# Patient Record
Sex: FEMALE | Race: WHITE | NOT HISPANIC OR LATINO | URBAN - METROPOLITAN AREA
[De-identification: names, ages, dates, MRNs, and addresses within clinical notes are randomized per-mention and may not be internally consistent; named-entity substitution may affect disease eponyms.]

---

## 2017-12-13 ENCOUNTER — APPOINTMENT (OUTPATIENT)
Dept: WOMENS IMAGING | Facility: HOSPITAL | Age: 57
End: 2017-12-13

## 2017-12-13 PROCEDURE — 77067 SCR MAMMO BI INCL CAD: CPT | Performed by: RADIOLOGY

## 2018-12-19 ENCOUNTER — APPOINTMENT (OUTPATIENT)
Dept: WOMENS IMAGING | Facility: HOSPITAL | Age: 58
End: 2018-12-19

## 2018-12-19 PROCEDURE — 77067 SCR MAMMO BI INCL CAD: CPT | Performed by: RADIOLOGY

## 2020-01-08 ENCOUNTER — APPOINTMENT (OUTPATIENT)
Dept: WOMENS IMAGING | Facility: HOSPITAL | Age: 60
End: 2020-01-08

## 2020-01-08 PROCEDURE — 77067 SCR MAMMO BI INCL CAD: CPT | Performed by: RADIOLOGY

## 2020-01-08 PROCEDURE — 77063 BREAST TOMOSYNTHESIS BI: CPT | Performed by: RADIOLOGY

## 2021-06-16 ENCOUNTER — APPOINTMENT (OUTPATIENT)
Dept: WOMENS IMAGING | Facility: HOSPITAL | Age: 61
End: 2021-06-16

## 2021-06-16 PROCEDURE — 77067 SCR MAMMO BI INCL CAD: CPT | Performed by: RADIOLOGY

## 2021-06-16 PROCEDURE — 77063 BREAST TOMOSYNTHESIS BI: CPT | Performed by: RADIOLOGY

## 2022-03-16 ENCOUNTER — OFFICE VISIT (OUTPATIENT)
Dept: INTERNAL MEDICINE | Facility: CLINIC | Age: 62
End: 2022-03-16

## 2022-03-16 VITALS
HEART RATE: 87 BPM | HEIGHT: 66 IN | TEMPERATURE: 97.8 F | WEIGHT: 177 LBS | DIASTOLIC BLOOD PRESSURE: 80 MMHG | BODY MASS INDEX: 28.45 KG/M2 | OXYGEN SATURATION: 99 % | RESPIRATION RATE: 16 BRPM | SYSTOLIC BLOOD PRESSURE: 148 MMHG

## 2022-03-16 DIAGNOSIS — Z11.59 NEED FOR HEPATITIS C SCREENING TEST: ICD-10-CM

## 2022-03-16 DIAGNOSIS — Z00.00 ROUTINE HEALTH MAINTENANCE: ICD-10-CM

## 2022-03-16 DIAGNOSIS — Z00.00 ANNUAL PHYSICAL EXAM: Primary | ICD-10-CM

## 2022-03-16 DIAGNOSIS — R03.0 ELEVATED BLOOD PRESSURE READING: ICD-10-CM

## 2022-03-16 PROCEDURE — 93000 ELECTROCARDIOGRAM COMPLETE: CPT | Performed by: NURSE PRACTITIONER

## 2022-03-16 PROCEDURE — 99386 PREV VISIT NEW AGE 40-64: CPT | Performed by: NURSE PRACTITIONER

## 2022-03-16 NOTE — PROGRESS NOTES
Subjective   Trang Resendez is a 61 y.o. female. Patient is here today for   Chief Complaint   Patient presents with   • Annual Exam          Vitals:    03/16/22 0956   BP: 148/80   Pulse: 87   Resp: 16   Temp: 97.8 °F (36.6 °C)   SpO2: 99%     Body mass index is 28.57 kg/m².    The following portions of the patient's history were reviewed and updated as appropriate: allergies, current medications, past family history, past medical history, past social history, past surgical history and problem list.    Past Medical History:   Diagnosis Date   • Allergic     penicillin, sulfa drugs, cyclines, amoxicillin, clindamycin, erythromycin, bee venom      Allergies   Allergen Reactions   • Bee Venom Swelling   • Clindamycin Rash   • Erythromycin Rash   • Penicillins Rash   • Sulfa Antibiotics Rash   • Tetracycline Rash      Social History     Socioeconomic History   • Marital status: Unknown   Tobacco Use   • Smoking status: Never Smoker   • Smokeless tobacco: Never Used   Substance and Sexual Activity   • Alcohol use: Never   • Drug use: Never   • Sexual activity: Yes     Partners: Male     Birth control/protection: Post-menopausal, None        Current Outpatient Medications:   •  COLLAGEN PO, , Disp: , Rfl:      EKG  ECG Report   ECG 12 Lead    Date/Time: 3/16/2022 10:44 AM  Performed by: Mili Freitas APRN  Authorized by: Mili Freitas APRN   Comparison: not compared with previous ECG   Previous ECG: no previous ECG available  Rhythm: sinus rhythm  Rate: normal  QRS axis: normal  Other findings comments: pwave v1/v2     Clinical impression: non-specific ECG              Objective   History of Present Illness   Trang Resendez 61 y.o. female  New patient who presents for an Annual Wellness Visit.  she has a history of There is no problem list on file for this patient.    She fell down stairs in January and has a hematoma to the left upper leg. She has been seeing an ortho doctor and has a follow up soon. At her  appts her blood pressure was elevated. She attributes this to the fall and stress. She went to the urgent care for BP check and has been checking her BP at home twice daily. Initially her BP was elevated and the last 2 weeks it has been in the normal range. BP log reviewed with patient.   Health Habits:  Dental Exam. up to date  Eye Exam. up to date  Exercise: 6 times/week.  Current exercise activities include: bicycling outdoors, cardiovascular workout on exercise equipment, light weights/kettlebells, running/ jogging, swimming and weightlifting      Lab Results (most recent)     None        PHQ-9 Depression Screening  Little interest or pleasure in doing things? 0-->not at all   Feeling down, depressed, or hopeless? 0-->not at all   Trouble falling or staying asleep, or sleeping too much?     Feeling tired or having little energy?     Poor appetite or overeating?     Feeling bad about yourself - or that you are a failure or have let yourself or your family down?     Trouble concentrating on things, such as reading the newspaper or watching television?     Moving or speaking so slowly that other people could have noticed? Or the opposite - being so fidgety or restless that you have been moving around a lot more than usual?     Thoughts that you would be better off dead, or of hurting yourself in some way?     PHQ-9 Total Score 0   If you checked off any problems, how difficult have these problems made it for you to do your work, take care of things at home, or get along with other people?               Review of Systems   HENT: Negative.    Respiratory: Negative.    Cardiovascular: Negative.    Gastrointestinal: Negative.    Genitourinary: Negative.    Musculoskeletal: Negative.         Hematoma of left upper leg after fall    Skin:        Hematoma on left leg from fall    Neurological: Negative.    Psychiatric/Behavioral: The patient is nervous/anxious (increased stress ).        Physical Exam  Vitals and nursing  note reviewed.   Constitutional:       General: She is not in acute distress.     Appearance: Normal appearance. She is well-developed and well-groomed.   HENT:      Head: Normocephalic.      Right Ear: Tympanic membrane and ear canal normal.      Left Ear: Tympanic membrane and ear canal normal.      Mouth/Throat:      Pharynx: Oropharynx is clear.   Eyes:      General: Lids are normal.      Conjunctiva/sclera: Conjunctivae normal.   Neck:      Thyroid: No thyromegaly.   Cardiovascular:      Rate and Rhythm: Normal rate and regular rhythm.   Pulmonary:      Effort: Pulmonary effort is normal.      Breath sounds: Normal breath sounds.   Abdominal:      General: Bowel sounds are normal.      Palpations: Abdomen is soft.   Musculoskeletal:      Cervical back: Neck supple.        Legs:    Skin:     General: Skin is warm and dry.   Neurological:      Mental Status: She is alert and oriented to person, place, and time.   Psychiatric:         Mood and Affect: Mood normal.         ASSESSMENT       Problems Addressed this Visit    None     Visit Diagnoses     Annual physical exam    -  Primary    Relevant Orders    CBC & Differential    Comprehensive Metabolic Panel    HCV Antibody Reflex To KATHERINE    Lipid Panel With LDL / HDL Ratio    TSH Rfx On Abnormal To Free T4    Urinalysis With Microscopic - Urine, Clean Catch    ECG 12 Lead    Routine health maintenance        Need for hepatitis C screening test        Relevant Orders    HCV Antibody Reflex To KATHERINE    Elevated blood pressure reading          Diagnoses       Codes Comments    Annual physical exam    -  Primary ICD-10-CM: Z00.00  ICD-9-CM: V70.0     Routine health maintenance     ICD-10-CM: Z00.00  ICD-9-CM: V70.0     Need for hepatitis C screening test     ICD-10-CM: Z11.59  ICD-9-CM: V73.89     Elevated blood pressure reading     ICD-10-CM: R03.0  ICD-9-CM: 796.2           PLAN  Bp is slightly elevated but has been normal at home , continue to monitor and call with  readings in a few weeks  Continue with exercise  [facility]  Recommend shingrix and tdap which are currently unavailable in office  Age and sex appropriate physical exam performed and documented. Updated past medical, family, social and surgical histories as well as allergies and care team list. Addressed care gaps listed in the medical record.   -Encouraged seat belt use for every car ride for patient and all occupants. Discussed securing of all guns in the home for patient and family protection. Encouraged sunscreen use to reduce risk of skin cancer for any days with sun exposure over 20 minutes. Recommended helmet if biking or riding motorcycle to prevent head trauma. Discussed the importance of smoke and carbon monoxide detectors in the home.   -Encouraged annual dental and vision exams as part of their overall health.   -Encouraged minimum of 30 minutes or more of exercise at a brisk walk or higher 5 days per week combined with a well-balanced diet.   -Immunizations reviewed and updated in EMR.     Return in about 1 year (around 3/16/2023) for Annual physical, with labs.

## 2022-03-17 LAB
ALBUMIN SERPL-MCNC: 4.7 G/DL (ref 3.8–4.8)
ALBUMIN/GLOB SERPL: 2 {RATIO} (ref 1.2–2.2)
ALP SERPL-CCNC: 93 IU/L (ref 44–121)
ALT SERPL-CCNC: 11 IU/L (ref 0–32)
APPEARANCE UR: CLEAR
AST SERPL-CCNC: 28 IU/L (ref 0–40)
BACTERIA #/AREA URNS HPF: ABNORMAL /[HPF]
BASOPHILS # BLD AUTO: 0.1 X10E3/UL (ref 0–0.2)
BASOPHILS NFR BLD AUTO: 1 %
BILIRUB SERPL-MCNC: 0.3 MG/DL (ref 0–1.2)
BILIRUB UR QL STRIP: NEGATIVE
BUN SERPL-MCNC: 15 MG/DL (ref 8–27)
BUN/CREAT SERPL: 17 (ref 12–28)
CALCIUM SERPL-MCNC: 9.4 MG/DL (ref 8.7–10.3)
CASTS URNS QL MICRO: ABNORMAL /LPF
CHLORIDE SERPL-SCNC: 103 MMOL/L (ref 96–106)
CHOLEST SERPL-MCNC: 146 MG/DL (ref 100–199)
CO2 SERPL-SCNC: 21 MMOL/L (ref 20–29)
COLOR UR: YELLOW
CREAT SERPL-MCNC: 0.88 MG/DL (ref 0.57–1)
EGFRCR SERPLBLD CKD-EPI 2021: 74 ML/MIN/1.73
EOSINOPHIL # BLD AUTO: 0.2 X10E3/UL (ref 0–0.4)
EOSINOPHIL NFR BLD AUTO: 3 %
EPI CELLS #/AREA URNS HPF: >10 /HPF (ref 0–10)
ERYTHROCYTE [DISTWIDTH] IN BLOOD BY AUTOMATED COUNT: 12.6 % (ref 11.7–15.4)
GLOBULIN SER CALC-MCNC: 2.4 G/DL (ref 1.5–4.5)
GLUCOSE SERPL-MCNC: 85 MG/DL (ref 65–99)
GLUCOSE UR QL STRIP: NEGATIVE
HCT VFR BLD AUTO: 43.2 % (ref 34–46.6)
HCV AB S/CO SERPL IA: <0.1 S/CO RATIO (ref 0–0.9)
HCV AB SERPL QL IA: NORMAL
HDLC SERPL-MCNC: 68 MG/DL
HGB BLD-MCNC: 14.6 G/DL (ref 11.1–15.9)
HGB UR QL STRIP: NEGATIVE
IMM GRANULOCYTES # BLD AUTO: 0 X10E3/UL (ref 0–0.1)
IMM GRANULOCYTES NFR BLD AUTO: 0 %
KETONES UR QL STRIP: NEGATIVE
LDLC SERPL CALC-MCNC: 62 MG/DL (ref 0–99)
LDLC/HDLC SERPL: 0.9 RATIO (ref 0–3.2)
LEUKOCYTE ESTERASE UR QL STRIP: ABNORMAL
LYMPHOCYTES # BLD AUTO: 2 X10E3/UL (ref 0.7–3.1)
LYMPHOCYTES NFR BLD AUTO: 35 %
MCH RBC QN AUTO: 30 PG (ref 26.6–33)
MCHC RBC AUTO-ENTMCNC: 33.8 G/DL (ref 31.5–35.7)
MCV RBC AUTO: 89 FL (ref 79–97)
MICRO URNS: ABNORMAL
MONOCYTES # BLD AUTO: 0.5 X10E3/UL (ref 0.1–0.9)
MONOCYTES NFR BLD AUTO: 9 %
NEUTROPHILS # BLD AUTO: 2.9 X10E3/UL (ref 1.4–7)
NEUTROPHILS NFR BLD AUTO: 52 %
NITRITE UR QL STRIP: NEGATIVE
PH UR STRIP: 6.5 [PH] (ref 5–7.5)
PLATELET # BLD AUTO: 232 X10E3/UL (ref 150–450)
POTASSIUM SERPL-SCNC: 4.4 MMOL/L (ref 3.5–5.2)
PROT SERPL-MCNC: 7.1 G/DL (ref 6–8.5)
PROT UR QL STRIP: NEGATIVE
RBC # BLD AUTO: 4.87 X10E6/UL (ref 3.77–5.28)
RBC #/AREA URNS HPF: ABNORMAL /HPF (ref 0–2)
SODIUM SERPL-SCNC: 142 MMOL/L (ref 134–144)
SP GR UR STRIP: 1.02 (ref 1–1.03)
TRIGL SERPL-MCNC: 83 MG/DL (ref 0–149)
TSH SERPL DL<=0.005 MIU/L-ACNC: 4.3 UIU/ML (ref 0.45–4.5)
UROBILINOGEN UR STRIP-MCNC: 0.2 MG/DL (ref 0.2–1)
VLDLC SERPL CALC-MCNC: 16 MG/DL (ref 5–40)
WBC # BLD AUTO: 5.6 X10E3/UL (ref 3.4–10.8)
WBC #/AREA URNS HPF: ABNORMAL /HPF (ref 0–5)

## 2022-06-22 ENCOUNTER — APPOINTMENT (OUTPATIENT)
Dept: WOMENS IMAGING | Facility: HOSPITAL | Age: 62
End: 2022-06-22

## 2022-06-22 PROCEDURE — 77067 SCR MAMMO BI INCL CAD: CPT | Performed by: RADIOLOGY

## 2022-06-22 PROCEDURE — 77063 BREAST TOMOSYNTHESIS BI: CPT | Performed by: RADIOLOGY

## 2023-03-08 DIAGNOSIS — Z00.00 ROUTINE HEALTH MAINTENANCE: Primary | ICD-10-CM

## 2023-03-15 LAB
ALBUMIN SERPL-MCNC: 4.3 G/DL (ref 3.5–5.2)
ALBUMIN/GLOB SERPL: 1.7 G/DL
ALP SERPL-CCNC: 95 U/L (ref 39–117)
ALT SERPL-CCNC: 9 U/L (ref 1–33)
APPEARANCE UR: CLEAR
AST SERPL-CCNC: 20 U/L (ref 1–32)
BACTERIA #/AREA URNS HPF: ABNORMAL /HPF
BASOPHILS # BLD AUTO: 0.04 10*3/MM3 (ref 0–0.2)
BASOPHILS NFR BLD AUTO: 0.6 % (ref 0–1.5)
BILIRUB SERPL-MCNC: 0.4 MG/DL (ref 0–1.2)
BILIRUB UR QL STRIP: NEGATIVE
BUN SERPL-MCNC: 14 MG/DL (ref 8–23)
BUN/CREAT SERPL: 13.9 (ref 7–25)
CALCIUM SERPL-MCNC: 10 MG/DL (ref 8.6–10.5)
CASTS URNS MICRO: ABNORMAL
CHLORIDE SERPL-SCNC: 105 MMOL/L (ref 98–107)
CHOLEST SERPL-MCNC: 154 MG/DL (ref 0–200)
CO2 SERPL-SCNC: 27.3 MMOL/L (ref 22–29)
COLOR UR: YELLOW
CREAT SERPL-MCNC: 1.01 MG/DL (ref 0.57–1)
EGFRCR SERPLBLD CKD-EPI 2021: 62.7 ML/MIN/1.73
EOSINOPHIL # BLD AUTO: 0.13 10*3/MM3 (ref 0–0.4)
EOSINOPHIL NFR BLD AUTO: 2.1 % (ref 0.3–6.2)
EPI CELLS #/AREA URNS HPF: ABNORMAL /HPF
ERYTHROCYTE [DISTWIDTH] IN BLOOD BY AUTOMATED COUNT: 13 % (ref 12.3–15.4)
GLOBULIN SER CALC-MCNC: 2.5 GM/DL
GLUCOSE SERPL-MCNC: 92 MG/DL (ref 65–99)
GLUCOSE UR QL STRIP: NEGATIVE
HCT VFR BLD AUTO: 42.5 % (ref 34–46.6)
HDLC SERPL-MCNC: 62 MG/DL (ref 40–60)
HGB BLD-MCNC: 14.8 G/DL (ref 12–15.9)
HGB UR QL STRIP: NEGATIVE
IMM GRANULOCYTES # BLD AUTO: 0.01 10*3/MM3 (ref 0–0.05)
IMM GRANULOCYTES NFR BLD AUTO: 0.2 % (ref 0–0.5)
KETONES UR QL STRIP: NEGATIVE
LDLC SERPL CALC-MCNC: 74 MG/DL (ref 0–100)
LDLC/HDLC SERPL: 1.16 {RATIO}
LEUKOCYTE ESTERASE UR QL STRIP: ABNORMAL
LYMPHOCYTES # BLD AUTO: 2.29 10*3/MM3 (ref 0.7–3.1)
LYMPHOCYTES NFR BLD AUTO: 36.5 % (ref 19.6–45.3)
MCH RBC QN AUTO: 30.1 PG (ref 26.6–33)
MCHC RBC AUTO-ENTMCNC: 34.8 G/DL (ref 31.5–35.7)
MCV RBC AUTO: 86.4 FL (ref 79–97)
MONOCYTES # BLD AUTO: 0.58 10*3/MM3 (ref 0.1–0.9)
MONOCYTES NFR BLD AUTO: 9.3 % (ref 5–12)
NEUTROPHILS # BLD AUTO: 3.22 10*3/MM3 (ref 1.7–7)
NEUTROPHILS NFR BLD AUTO: 51.3 % (ref 42.7–76)
NITRITE UR QL STRIP: NEGATIVE
NRBC BLD AUTO-RTO: 0 /100 WBC (ref 0–0.2)
PH UR STRIP: 6.5 [PH] (ref 5–8)
PLATELET # BLD AUTO: 238 10*3/MM3 (ref 140–450)
POTASSIUM SERPL-SCNC: 4.6 MMOL/L (ref 3.5–5.2)
PROT SERPL-MCNC: 6.8 G/DL (ref 6–8.5)
PROT UR QL STRIP: NEGATIVE
RBC # BLD AUTO: 4.92 10*6/MM3 (ref 3.77–5.28)
RBC #/AREA URNS HPF: ABNORMAL /HPF
SODIUM SERPL-SCNC: 142 MMOL/L (ref 136–145)
SP GR UR STRIP: 1.02 (ref 1–1.03)
TRIGL SERPL-MCNC: 101 MG/DL (ref 0–150)
TSH SERPL DL<=0.005 MIU/L-ACNC: 3.95 UIU/ML (ref 0.27–4.2)
UROBILINOGEN UR STRIP-MCNC: ABNORMAL MG/DL
VLDLC SERPL CALC-MCNC: 18 MG/DL (ref 5–40)
WBC # BLD AUTO: 6.27 10*3/MM3 (ref 3.4–10.8)
WBC #/AREA URNS HPF: ABNORMAL /HPF

## 2023-03-22 ENCOUNTER — OFFICE VISIT (OUTPATIENT)
Dept: INTERNAL MEDICINE | Facility: CLINIC | Age: 63
End: 2023-03-22
Payer: COMMERCIAL

## 2023-03-22 VITALS
DIASTOLIC BLOOD PRESSURE: 80 MMHG | TEMPERATURE: 97.8 F | SYSTOLIC BLOOD PRESSURE: 138 MMHG | RESPIRATION RATE: 16 BRPM | BODY MASS INDEX: 27.48 KG/M2 | OXYGEN SATURATION: 98 % | WEIGHT: 171 LBS | HEART RATE: 88 BPM | HEIGHT: 66 IN

## 2023-03-22 DIAGNOSIS — Z00.00 ANNUAL PHYSICAL EXAM: Primary | ICD-10-CM

## 2023-03-22 DIAGNOSIS — Z23 NEED FOR TETANUS, DIPHTHERIA, AND ACELLULAR PERTUSSIS (TDAP) VACCINE IN PATIENT OF ADOLESCENT AGE OR OLDER: ICD-10-CM

## 2023-03-22 DIAGNOSIS — Z12.31 SCREENING MAMMOGRAM FOR BREAST CANCER: ICD-10-CM

## 2024-01-24 ENCOUNTER — TELEPHONE (OUTPATIENT)
Dept: INTERNAL MEDICINE | Facility: CLINIC | Age: 64
End: 2024-01-24
Payer: COMMERCIAL

## 2024-01-24 NOTE — TELEPHONE ENCOUNTER
Spoke to patient and informed her that Mili Freitas doesn't do gynecologic exams or pap smears. Mili can refer you to OBGYN. Patient states she must have misunderstood her because her last visit she thought Veronica told her she could get all that done in our office. Patient states she had [facility] do this for her previously. She states its been over a year and will call and handle this herself. I informed patient if she needed anything to call our office back.    Thanks,    Alejandrina       ----- Message from Trang Resendez sent at 1/24/2024 12:52 PM EST -----  Regarding: Appointment Request ()  Contact: [phone]  Appointment Request From: Trang Resendez    With Provider: Mili Freitas [-Primary Care Physician-]    Preferred Date Range: From 3/27/2024 To 3/27/2024    Preferred Times: Any    Reason: To address the following health maintenance concerns.  Annual Gynecologic Pelvic And Breast Exam  Pap Smear    Comments:  I have an appointment with PCP Zena on 3/27 at 3:15 pm.  This is just a reminder that she was going to perform a gynecologic exam and pap smear during the appointment.  Thank you.

## 2024-01-24 NOTE — TELEPHONE ENCOUNTER
Spoke to patient and informed her that Mili Freitas doesn't do mammograms in our office. We can refer her to [facility] or [facility]. Patient states she must have misunderstood her because her last visit she thought Veronica told her she could get all that done in our office. Patient states she had [facility] do this for her previously. She states its been over a year and will call and handle this herself. I informed patient if she needed anything to call our office back.    Alejandrina Caban     ----- Message from Trang Resendez sent at 1/24/2024 12:50 PM EST -----  Regarding: Appointment Request ()  Contact: [phone]  Appointment Request From: Trang Resendez    With Provider: Mili Freitas [-Primary Care Physician-]    Preferred Date Range: From 3/20/2024 To 3/20/2024    Preferred Times: Any    Reason: To address the following health maintenance concerns.  Mammogram    Comments:  I have an appointment for blood work at 8:10am and would like to get the mammogram either before or after.

## 2024-04-10 DIAGNOSIS — Z00.00 ROUTINE HEALTH MAINTENANCE: Primary | ICD-10-CM

## 2024-04-18 LAB
ALBUMIN SERPL-MCNC: 4.3 G/DL (ref 3.5–5.2)
ALBUMIN/GLOB SERPL: 2 G/DL
ALP SERPL-CCNC: 99 U/L (ref 39–117)
ALT SERPL-CCNC: 12 U/L (ref 1–33)
APPEARANCE UR: CLEAR
AST SERPL-CCNC: 20 U/L (ref 1–32)
BACTERIA #/AREA URNS HPF: NORMAL /HPF
BASOPHILS # BLD AUTO: 0.04 10*3/MM3 (ref 0–0.2)
BASOPHILS NFR BLD AUTO: 0.7 % (ref 0–1.5)
BILIRUB SERPL-MCNC: 0.4 MG/DL (ref 0–1.2)
BILIRUB UR QL STRIP: NEGATIVE
BUN SERPL-MCNC: 16 MG/DL (ref 8–23)
BUN/CREAT SERPL: 15.8 (ref 7–25)
CALCIUM SERPL-MCNC: 9.4 MG/DL (ref 8.6–10.5)
CASTS URNS MICRO: NORMAL
CHLORIDE SERPL-SCNC: 108 MMOL/L (ref 98–107)
CHOLEST SERPL-MCNC: 154 MG/DL (ref 0–200)
CO2 SERPL-SCNC: 25.3 MMOL/L (ref 22–29)
COLOR UR: YELLOW
CREAT SERPL-MCNC: 1.01 MG/DL (ref 0.57–1)
EGFRCR SERPLBLD CKD-EPI 2021: 62.3 ML/MIN/1.73
EOSINOPHIL # BLD AUTO: 0.1 10*3/MM3 (ref 0–0.4)
EOSINOPHIL NFR BLD AUTO: 1.7 % (ref 0.3–6.2)
EPI CELLS #/AREA URNS HPF: NORMAL /HPF
ERYTHROCYTE [DISTWIDTH] IN BLOOD BY AUTOMATED COUNT: 13.1 % (ref 12.3–15.4)
GLOBULIN SER CALC-MCNC: 2.2 GM/DL
GLUCOSE SERPL-MCNC: 80 MG/DL (ref 65–99)
GLUCOSE UR QL STRIP: NEGATIVE
HCT VFR BLD AUTO: 42.7 % (ref 34–46.6)
HDLC SERPL-MCNC: 63 MG/DL (ref 40–60)
HGB BLD-MCNC: 14.5 G/DL (ref 12–15.9)
HGB UR QL STRIP: NEGATIVE
IMM GRANULOCYTES # BLD AUTO: 0.02 10*3/MM3 (ref 0–0.05)
IMM GRANULOCYTES NFR BLD AUTO: 0.3 % (ref 0–0.5)
KETONES UR QL STRIP: ABNORMAL
LDLC SERPL CALC-MCNC: 76 MG/DL (ref 0–100)
LDLC/HDLC SERPL: 1.19 {RATIO}
LEUKOCYTE ESTERASE UR QL STRIP: NEGATIVE
LYMPHOCYTES # BLD AUTO: 1.76 10*3/MM3 (ref 0.7–3.1)
LYMPHOCYTES NFR BLD AUTO: 29.2 % (ref 19.6–45.3)
MCH RBC QN AUTO: 29.9 PG (ref 26.6–33)
MCHC RBC AUTO-ENTMCNC: 34 G/DL (ref 31.5–35.7)
MCV RBC AUTO: 88 FL (ref 79–97)
MONOCYTES # BLD AUTO: 0.42 10*3/MM3 (ref 0.1–0.9)
MONOCYTES NFR BLD AUTO: 7 % (ref 5–12)
NEUTROPHILS # BLD AUTO: 3.68 10*3/MM3 (ref 1.7–7)
NEUTROPHILS NFR BLD AUTO: 61.1 % (ref 42.7–76)
NITRITE UR QL STRIP: NEGATIVE
NRBC BLD AUTO-RTO: 0 /100 WBC (ref 0–0.2)
PH UR STRIP: 5.5 [PH] (ref 5–8)
PLATELET # BLD AUTO: 240 10*3/MM3 (ref 140–450)
POTASSIUM SERPL-SCNC: 4.5 MMOL/L (ref 3.5–5.2)
PROT SERPL-MCNC: 6.5 G/DL (ref 6–8.5)
PROT UR QL STRIP: NEGATIVE
RBC # BLD AUTO: 4.85 10*6/MM3 (ref 3.77–5.28)
RBC #/AREA URNS HPF: NORMAL /HPF
SODIUM SERPL-SCNC: 146 MMOL/L (ref 136–145)
SP GR UR STRIP: 1.02 (ref 1–1.03)
TRIGL SERPL-MCNC: 79 MG/DL (ref 0–150)
TSH SERPL DL<=0.005 MIU/L-ACNC: 2.84 UIU/ML (ref 0.27–4.2)
UROBILINOGEN UR STRIP-MCNC: ABNORMAL MG/DL
VLDLC SERPL CALC-MCNC: 15 MG/DL (ref 5–40)
WBC # BLD AUTO: 6.02 10*3/MM3 (ref 3.4–10.8)
WBC #/AREA URNS HPF: NORMAL /HPF

## 2024-04-22 ENCOUNTER — OFFICE VISIT (OUTPATIENT)
Dept: INTERNAL MEDICINE | Facility: CLINIC | Age: 64
End: 2024-04-22
Payer: COMMERCIAL

## 2024-04-22 VITALS
RESPIRATION RATE: 16 BRPM | HEART RATE: 78 BPM | HEIGHT: 66 IN | WEIGHT: 185 LBS | BODY MASS INDEX: 29.73 KG/M2 | DIASTOLIC BLOOD PRESSURE: 92 MMHG | SYSTOLIC BLOOD PRESSURE: 146 MMHG

## 2024-04-22 DIAGNOSIS — R03.0 ELEVATED BLOOD PRESSURE READING: ICD-10-CM

## 2024-04-22 DIAGNOSIS — Z00.00 ANNUAL PHYSICAL EXAM: Primary | ICD-10-CM

## 2024-04-22 PROCEDURE — 99396 PREV VISIT EST AGE 40-64: CPT | Performed by: NURSE PRACTITIONER

## 2024-04-22 NOTE — PROGRESS NOTES
Subjective   Trang Rseendez is a 63 y.o. female. Patient is here today for   Chief Complaint   Patient presents with    Annual Exam          Vitals:    04/22/24 1441   BP: 146/92   Pulse: 78   Resp: 16     Body mass index is 30.09 kg/m².    The following portions of the patient's history were reviewed and updated as appropriate: allergies, current medications, past family history, past medical history, past social history, past surgical history and problem list.    Past Medical History:   Diagnosis Date    Allergic     penicillin, sulfa drugs, cyclines, amoxicillin, clindamycin, erythromycin, bee venom    History of medical problems February 2022    Ringing in ears      Allergies   Allergen Reactions    Bee Venom Swelling    Clindamycin Rash    Erythromycin Rash    Penicillins Rash    Sulfa Antibiotics Rash    Tetracycline Rash      Social History     Socioeconomic History    Marital status:    Tobacco Use    Smoking status: Never    Smokeless tobacco: Never   Vaping Use    Vaping status: Never Used   Substance and Sexual Activity    Alcohol use: Never    Drug use: Never    Sexual activity: Yes     Partners: Male     Birth control/protection: Post-menopausal, None        Current Outpatient Medications:     COLLAGEN PO, Take  by mouth., Disp: , Rfl:        Objective   History of Present Illness   Trang Resendez 63 y.o. female who presents for an Annual physical exam.    Plan to update vaccines if needed today. She has had high blood pressure readings in the office in the past. She has been monitoring her BP at home and it is mostly in the 130/80's range but she has had some readings in the 140's  She has a gyn     Health Habits:  Dental Exam. up to date  Eye Exam. up to date  Exercise: 4 times/week.  Current exercise activities include: cardiovascular workout on exercise equipment      Preventative counseling  Discussed face to face the importance of healthy diet and exercise.     Lab Results (most recent)        Orders Only on 04/10/2024   Component Date Value Ref Range Status    Glucose 04/17/2024 80  65 - 99 mg/dL Final    BUN 04/17/2024 16  8 - 23 mg/dL Final    Creatinine 04/17/2024 1.01 (H)  0.57 - 1.00 mg/dL Final    EGFR Result 04/17/2024 62.3  >60.0 mL/min/1.73 Final    Comment: GFR Normal >60  Chronic Kidney Disease <60  Kidney Failure <15      BUN/Creatinine Ratio 04/17/2024 15.8  7.0 - 25.0 Final    Sodium 04/17/2024 146 (H)  136 - 145 mmol/L Final    Potassium 04/17/2024 4.5  3.5 - 5.2 mmol/L Final    Chloride 04/17/2024 108 (H)  98 - 107 mmol/L Final    Total CO2 04/17/2024 25.3  22.0 - 29.0 mmol/L Final    Calcium 04/17/2024 9.4  8.6 - 10.5 mg/dL Final    Total Protein 04/17/2024 6.5  6.0 - 8.5 g/dL Final    Albumin 04/17/2024 4.3  3.5 - 5.2 g/dL Final    Globulin 04/17/2024 2.2  gm/dL Final    A/G Ratio 04/17/2024 2.0  g/dL Final    Total Bilirubin 04/17/2024 0.4  0.0 - 1.2 mg/dL Final    Alkaline Phosphatase 04/17/2024 99  39 - 117 U/L Final    AST (SGOT) 04/17/2024 20  1 - 32 U/L Final    ALT (SGPT) 04/17/2024 12  1 - 33 U/L Final    Total Cholesterol 04/17/2024 154  0 - 200 mg/dL Final    Comment: Cholesterol Reference Ranges  (U.S. Department of Health and Human Services ATP III  Classifications)  Desirable          <200 mg/dL  Borderline High    200-239 mg/dL  High Risk          >240 mg/dL  Triglyceride Reference Ranges  (U.S. Department of Health and Human Services ATP III  Classifications)  Normal           <150 mg/dL  Borderline High  150-199 mg/dL  High             200-499 mg/dL  Very High        >500 mg/dL  HDL Reference Ranges  (U.S. Department of Health and Human Services ATP III  Classifications)  Low     <40 mg/dl (major risk factor for CHD)  High    >60 mg/dl ('negative' risk factor for CHD)  LDL Reference Ranges  (U.S. Department of Health and Human Services ATP III  Classifications)  Optimal          <100 mg/dL  Near Optimal     100-129 mg/dL  Borderline High  130-159 mg/dL  High              160-189 mg/dL  Very High        >189 mg/dL      Triglycerides 04/17/2024 79  0 - 150 mg/dL Final    HDL Cholesterol 04/17/2024 63 (H)  40 - 60 mg/dL Final    VLDL Cholesterol Damion 04/17/2024 15  5 - 40 mg/dL Final    LDL Chol Calc (NIH) 04/17/2024 76  0 - 100 mg/dL Final    LDL/HDL RATIO 04/17/2024 1.19   Final    TSH 04/17/2024 2.840  0.270 - 4.200 uIU/mL Final    WBC 04/17/2024 6.02  3.40 - 10.80 10*3/mm3 Final    RBC 04/17/2024 4.85  3.77 - 5.28 10*6/mm3 Final    Hemoglobin 04/17/2024 14.5  12.0 - 15.9 g/dL Final    Hematocrit 04/17/2024 42.7  34.0 - 46.6 % Final    MCV 04/17/2024 88.0  79.0 - 97.0 fL Final    MCH 04/17/2024 29.9  26.6 - 33.0 pg Final    MCHC 04/17/2024 34.0  31.5 - 35.7 g/dL Final    RDW 04/17/2024 13.1  12.3 - 15.4 % Final    Platelets 04/17/2024 240  140 - 450 10*3/mm3 Final    Neutrophil Rel % 04/17/2024 61.1  42.7 - 76.0 % Final    Lymphocyte Rel % 04/17/2024 29.2  19.6 - 45.3 % Final    Monocyte Rel % 04/17/2024 7.0  5.0 - 12.0 % Final    Eosinophil Rel % 04/17/2024 1.7  0.3 - 6.2 % Final    Basophil Rel % 04/17/2024 0.7  0.0 - 1.5 % Final    Neutrophils Absolute 04/17/2024 3.68  1.70 - 7.00 10*3/mm3 Final    Lymphocytes Absolute 04/17/2024 1.76  0.70 - 3.10 10*3/mm3 Final    Monocytes Absolute 04/17/2024 0.42  0.10 - 0.90 10*3/mm3 Final    Eosinophils Absolute 04/17/2024 0.10  0.00 - 0.40 10*3/mm3 Final    Basophils Absolute 04/17/2024 0.04  0.00 - 0.20 10*3/mm3 Final    Immature Granulocyte Rel % 04/17/2024 0.3  0.0 - 0.5 % Final    Immature Grans Absolute 04/17/2024 0.02  0.00 - 0.05 10*3/mm3 Final    nRBC 04/17/2024 0.0  0.0 - 0.2 /100 WBC Final    Specific Gravity, UA 04/17/2024 1.016  1.005 - 1.030 Final    pH, UA 04/17/2024 5.5  5.0 - 8.0 Final    Color, UA 04/17/2024 Yellow   Final    REFERENCE RANGE: Yellow, Straw    Appearance, UA 04/17/2024 Clear  Clear Final    Leukocytes, UA 04/17/2024 Negative  Negative Final    Protein 04/17/2024 Negative  Negative Final    Glucose, UA  04/17/2024 Negative  Negative Final    Ketones 04/17/2024 Trace (A)  Negative Final    Blood, UA 04/17/2024 Negative  Negative Final    Bilirubin, UA 04/17/2024 Negative  Negative Final    Urobilinogen, UA 04/17/2024 Comment   Final    Comment: 0.2 E.U./dL  REFERENCE RANGE: 0.2 - 1.0 E.U./dL      Nitrite, UA 04/17/2024 Negative  Negative Final    WBC, UA 04/17/2024 0-2  /HPF Final    REFERENCE RANGE: None Seen, 0-2    RBC, UA 04/17/2024 0-2  /HPF Final    REFERENCE RANGE: None Seen, 0-2    Epithelial Cells (non renal) 04/17/2024 0-2  /HPF Final    REFERENCE RANGE: None Seen, 0-2    Cast Type 04/17/2024 Comment   Final    HYALINE CASTS, UA            0-2              /LPF       None Seen  N    Bacteria, UA 04/17/2024 Comment  None Seen /HPF Final    None Seen                   Review of Systems   Constitutional:  Negative for fatigue.   Respiratory: Negative.     Cardiovascular:  Negative for chest pain, palpitations and leg swelling.   Musculoskeletal: Negative.    Skin: Negative.         Varicose veins    Neurological: Negative.    Hematological: Negative.    Psychiatric/Behavioral:  Negative for dysphoric mood and sleep disturbance. Nervous/anxious: increased stress.        Physical Exam  Vitals and nursing note reviewed.   Constitutional:       General: She is not in acute distress.     Appearance: Normal appearance. She is well-developed and well-groomed.   HENT:      Head: Normocephalic.      Right Ear: Tympanic membrane and ear canal normal.      Left Ear: Tympanic membrane and ear canal normal.      Nose: Nose normal.      Mouth/Throat:      Pharynx: Oropharynx is clear.   Eyes:      General: Lids are normal.      Conjunctiva/sclera: Conjunctivae normal.   Neck:      Thyroid: No thyromegaly.   Cardiovascular:      Rate and Rhythm: Normal rate and regular rhythm.      Heart sounds: Normal heart sounds.      Comments: Lower extremity varicose veins noted bilaterally   BP recheck 168/90    Pulmonary:       Effort: Pulmonary effort is normal.      Breath sounds: Normal breath sounds.   Musculoskeletal:      Cervical back: Neck supple.      Right lower leg: No edema.      Left lower leg: No edema.   Skin:     General: Skin is warm and dry.   Neurological:      Mental Status: She is alert and oriented to person, place, and time.   Psychiatric:         Mood and Affect: Mood normal.         ASSESSMENT       Problems Addressed this Visit    None  Visit Diagnoses       Annual physical exam    -  Primary    Elevated blood pressure reading              Diagnoses         Codes Comments    Annual physical exam    -  Primary ICD-10-CM: Z00.00  ICD-9-CM: V70.0     Elevated blood pressure reading     ICD-10-CM: R03.0  ICD-9-CM: 796.2             PLAN    BP is elevated today. She has been monitoring it at home and it has been in the normal range. She will continue to monitor at home  She was advised to call/follow up if it is consistently >140/90.   She can wear compression stockings for varicose veins. Discussed referral to vascular if needed. She was advised to follow up if she has any lower extremity edema, redness, or pain   She has referral for colonoscopy placed . She will complete the paper work mailed to her   Will call [facility] and get recent pap and mammogram   Discussed RSV. And shingrix vaccines. Also discussed covid boosters  Age and sex appropriate physical exam performed and documented. Updated past medical, family, social and surgical histories as well as allergies and care team list. Addressed care gaps listed in the medical record.   - seat belt use for every car ride for patient and all occupants. Encouraged sunscreen use to reduce risk of skin cancer for any days with sun exposure over 20 minutes.   -Encouraged annual dental and vision exams as part of their overall health.   -Encouraged  exercise  combined with a well-balanced diet.   -Immunizations reviewed and updated in EMR.       Return in about 1 year  (around 4/22/2025) for Annual physical, with labs.  Patient was given instructions and counseling regarding her  condition for health maintenance advice.  Please see specific information pulled into the AVS if appropriate.

## 2024-05-07 ENCOUNTER — PREP FOR SURGERY (OUTPATIENT)
Dept: OTHER | Facility: HOSPITAL | Age: 64
End: 2024-05-07
Payer: COMMERCIAL

## 2024-05-07 DIAGNOSIS — Z80.0 FAMILY HISTORY OF COLON CANCER IN FATHER: ICD-10-CM

## 2024-05-07 DIAGNOSIS — Z12.11 SCREENING FOR COLON CANCER: Primary | ICD-10-CM

## 2024-05-07 DIAGNOSIS — Z80.0 FAMILY HISTORY OF COLON CANCER IN MOTHER: ICD-10-CM

## 2024-05-08 NOTE — PROGRESS NOTES
Subjective   Trang Resendez is a 62 y.o. female. Patient is here today for   Chief Complaint   Patient presents with   • Annual Exam          Vitals:    03/22/23 0802   BP: 138/80   Pulse: 88   Resp: 16   Temp: 97.8 °F (36.6 °C)   SpO2: 98%     Body mass index is 27.6 kg/m².    The following portions of the patient's history were reviewed and updated as appropriate: allergies, current medications, past family history, past medical history, past social history, past surgical history and problem list.    Past Medical History:   Diagnosis Date   • Allergic     penicillin, sulfa drugs, cyclines, amoxicillin, clindamycin, erythromycin, bee venom   • History of medical problems February 2022    Ringing in ears      Allergies   Allergen Reactions   • Bee Venom Swelling   • Clindamycin Rash   • Erythromycin Rash   • Penicillins Rash   • Sulfa Antibiotics Rash   • Tetracycline Rash      Social History     Socioeconomic History   • Marital status:    Tobacco Use   • Smoking status: Never   • Smokeless tobacco: Never   Vaping Use   • Vaping Use: Never used   Substance and Sexual Activity   • Alcohol use: Never   • Drug use: Never   • Sexual activity: Yes     Partners: Male     Birth control/protection: Post-menopausal, None        Current Outpatient Medications:   •  COLLAGEN PO, Take  by mouth., Disp: , Rfl:        Objective   History of Present Illness   Trang Resendez 62 y.o. female who presents for an Annual physical exam.   Lab results discussed in detail with the patient.  Plan to update vaccines if needed today.    Health Habits:  Dental Exam. up to date  Eye Exam. up to date  Exercise: 6 times/week.  Current exercise activities include: bicycling outdoors, light weights/kettlebells, running/ jogging and swimming    Preventative counseling  Discussed face to face the importance of healthy diet and exercise.     Lab Results (most recent)     Orders Only on 03/08/2023   Component Date Value Ref Range Status   • WBC  03/15/2023 6.27  3.40 - 10.80 10*3/mm3 Final   • RBC 03/15/2023 4.92  3.77 - 5.28 10*6/mm3 Final   • Hemoglobin 03/15/2023 14.8  12.0 - 15.9 g/dL Final   • Hematocrit 03/15/2023 42.5  34.0 - 46.6 % Final   • MCV 03/15/2023 86.4  79.0 - 97.0 fL Final   • MCH 03/15/2023 30.1  26.6 - 33.0 pg Final   • MCHC 03/15/2023 34.8  31.5 - 35.7 g/dL Final   • RDW 03/15/2023 13.0  12.3 - 15.4 % Final   • Platelets 03/15/2023 238  140 - 450 10*3/mm3 Final   • Neutrophil Rel % 03/15/2023 51.3  42.7 - 76.0 % Final   • Lymphocyte Rel % 03/15/2023 36.5  19.6 - 45.3 % Final   • Monocyte Rel % 03/15/2023 9.3  5.0 - 12.0 % Final   • Eosinophil Rel % 03/15/2023 2.1  0.3 - 6.2 % Final   • Basophil Rel % 03/15/2023 0.6  0.0 - 1.5 % Final   • Neutrophils Absolute 03/15/2023 3.22  1.70 - 7.00 10*3/mm3 Final   • Lymphocytes Absolute 03/15/2023 2.29  0.70 - 3.10 10*3/mm3 Final   • Monocytes Absolute 03/15/2023 0.58  0.10 - 0.90 10*3/mm3 Final   • Eosinophils Absolute 03/15/2023 0.13  0.00 - 0.40 10*3/mm3 Final   • Basophils Absolute 03/15/2023 0.04  0.00 - 0.20 10*3/mm3 Final   • Immature Granulocyte Rel % 03/15/2023 0.2  0.0 - 0.5 % Final   • Immature Grans Absolute 03/15/2023 0.01  0.00 - 0.05 10*3/mm3 Final   • nRBC 03/15/2023 0.0  0.0 - 0.2 /100 WBC Final   • Glucose 03/15/2023 92  65 - 99 mg/dL Final   • BUN 03/15/2023 14  8 - 23 mg/dL Final   • Creatinine 03/15/2023 1.01 (H)  0.57 - 1.00 mg/dL Final   • EGFR Result 03/15/2023 62.7  >60.0 mL/min/1.73 Final    Comment: GFR Normal >60  Chronic Kidney Disease <60  Kidney Failure <15     • BUN/Creatinine Ratio 03/15/2023 13.9  7.0 - 25.0 Final   • Sodium 03/15/2023 142  136 - 145 mmol/L Final   • Potassium 03/15/2023 4.6  3.5 - 5.2 mmol/L Final    Comment: Slight hemolysis detected by analyzer. Results may be  affected.     • Chloride 03/15/2023 105  98 - 107 mmol/L Final   • Total CO2 03/15/2023 27.3  22.0 - 29.0 mmol/L Final   • Calcium 03/15/2023 10.0  8.6 - 10.5 mg/dL Final   • Total  Protein 03/15/2023 6.8  6.0 - 8.5 g/dL Final   • Albumin 03/15/2023 4.3  3.5 - 5.2 g/dL Final   • Globulin 03/15/2023 2.5  gm/dL Final   • A/G Ratio 03/15/2023 1.7  g/dL Final   • Total Bilirubin 03/15/2023 0.4  0.0 - 1.2 mg/dL Final   • Alkaline Phosphatase 03/15/2023 95  39 - 117 U/L Final   • AST (SGOT) 03/15/2023 20  1 - 32 U/L Final   • ALT (SGPT) 03/15/2023 9  1 - 33 U/L Final   • Total Cholesterol 03/15/2023 154  0 - 200 mg/dL Final    Comment: Cholesterol Reference Ranges  (U.S. Department of Health and Human Services ATP III  Classifications)  Desirable          <200 mg/dL  Borderline High    200-239 mg/dL  High Risk          >240 mg/dL  Triglyceride Reference Ranges  (U.S. Department of Health and Human Services ATP III  Classifications)  Normal           <150 mg/dL  Borderline High  150-199 mg/dL  High             200-499 mg/dL  Very High        >500 mg/dL  HDL Reference Ranges  (U.S. Department of Health and Human Services ATP III  Classifications)  Low     <40 mg/dl (major risk factor for CHD)  High    >60 mg/dl ('negative' risk factor for CHD)  LDL Reference Ranges  (U.S. Department of Health and Human Services ATP III  Classifications)  Optimal          <100 mg/dL  Near Optimal     100-129 mg/dL  Borderline High  130-159 mg/dL  High             160-189 mg/dL  Very High        >189 mg/dL     • Triglycerides 03/15/2023 101  0 - 150 mg/dL Final   • HDL Cholesterol 03/15/2023 62 (H)  40 - 60 mg/dL Final   • VLDL Cholesterol Damion 03/15/2023 18  5 - 40 mg/dL Final   • LDL Chol Calc (NIH) 03/15/2023 74  0 - 100 mg/dL Final   • LDL/HDL RATIO 03/15/2023 1.16   Final   • TSH 03/15/2023 3.950  0.270 - 4.200 uIU/mL Final   • Specific Gravity, UA 03/15/2023 1.021  1.005 - 1.030 Final   • pH, UA 03/15/2023 6.5  5.0 - 8.0 Final   • Color, UA 03/15/2023 Yellow   Final    REFERENCE RANGE: Yellow, Straw   • Appearance, UA 03/15/2023 Clear  Clear Final   • Leukocytes, UA 03/15/2023 Trace (A)  Negative Final   • Protein  03/15/2023 Negative  Negative Final   • Glucose, UA 03/15/2023 Negative  Negative Final   • Ketones 03/15/2023 Negative  Negative Final   • Blood, UA 03/15/2023 Negative  Negative Final   • Bilirubin, UA 03/15/2023 Negative  Negative Final   • Urobilinogen, UA 03/15/2023 Comment   Final    Comment: 0.2 E.U./dL  REFERENCE RANGE: 0.2 - 1.0 E.U./dL     • Nitrite, UA 03/15/2023 Negative  Negative Final   • WBC, UA 03/15/2023 3-5 (A)  /HPF Final    REFERENCE RANGE: None Seen, 0-2   • RBC, UA 03/15/2023 0-2  /HPF Final    REFERENCE RANGE: None Seen, 0-2   • Epithelial Cells (non renal) 03/15/2023 3-6 (A)  /HPF Final    REFERENCE RANGE: None Seen, 0-2   • Cast Type 03/15/2023 Comment   Final    HYALINE CASTS, UA            0-2              /LPF       None Seen  N   • Bacteria, UA 03/15/2023 Trace (A)  None Seen /HPF Final             PHQ-9 Depression Screening  Little interest or pleasure in doing things? 0-->not at all   Feeling down, depressed, or hopeless? 0-->not at all   Trouble falling or staying asleep, or sleeping too much?     Feeling tired or having little energy?     Poor appetite or overeating?     Feeling bad about yourself - or that you are a failure or have let yourself or your family down?     Trouble concentrating on things, such as reading the newspaper or watching television?     Moving or speaking so slowly that other people could have noticed? Or the opposite - being so fidgety or restless that you have been moving around a lot more than usual?     Thoughts that you would be better off dead, or of hurting yourself in some way?     PHQ-9 Total Score 0   If you checked off any problems, how difficult have these problems made it for you to do your work, take care of things at home, or get along with other people?             Review of Systems   Constitutional: Negative for fatigue.   HENT: Positive for congestion and postnasal drip.    Respiratory: Negative.    Cardiovascular: Negative.     Gastrointestinal: Negative.    Genitourinary: Negative.    Skin:        Reports she still has a hematoma from a fall 14 months  on her left thigh    Allergic/Immunologic: Positive for environmental allergies.   Neurological: Negative.    Psychiatric/Behavioral: Negative.      PHQ-9 Depression Screening  Little interest or pleasure in doing things? 0-->not at all   Feeling down, depressed, or hopeless? 0-->not at all   Trouble falling or staying asleep, or sleeping too much?     Feeling tired or having little energy?     Poor appetite or overeating?     Feeling bad about yourself - or that you are a failure or have let yourself or your family down?     Trouble concentrating on things, such as reading the newspaper or watching television?     Moving or speaking so slowly that other people could have noticed? Or the opposite - being so fidgety or restless that you have been moving around a lot more than usual?     Thoughts that you would be better off dead, or of hurting yourself in some way?     PHQ-9 Total Score 0   If you checked off any problems, how difficult have these problems made it for you to do your work, take care of things at home, or get along with other people?             Physical Exam  Vitals and nursing note reviewed.   Constitutional:       General: She is not in acute distress.     Appearance: Normal appearance. She is well-developed and well-groomed.   HENT:      Head: Normocephalic.      Right Ear: A middle ear effusion is present. Tympanic membrane is not erythematous.      Left Ear: A middle ear effusion is present. Tympanic membrane is not erythematous.      Nose: Mucosal edema and congestion present.      Mouth/Throat:      Pharynx: Posterior oropharyngeal erythema present.   Eyes:      General: Lids are normal.      Conjunctiva/sclera: Conjunctivae normal.   Neck:      Thyroid: No thyromegaly.   Cardiovascular:      Rate and Rhythm: Normal rate and regular rhythm.      Heart sounds: Normal  heart sounds.   Pulmonary:      Effort: Pulmonary effort is normal.      Breath sounds: Normal breath sounds.   Chest:   Breasts:     Right: No swelling, inverted nipple, mass, nipple discharge, skin change or tenderness.      Left: No swelling, bleeding, inverted nipple, mass, nipple discharge, skin change or tenderness.   Abdominal:      General: Bowel sounds are normal.      Palpations: Abdomen is soft.      Tenderness: There is no abdominal tenderness.   Genitourinary:     Pubic Area: No rash.       Labia:         Right: No tenderness or lesion. Labial rash: erythema noted bilaterally          Left: No tenderness or lesion.       Vagina: No tenderness.      Uterus: Normal.       Adnexa: Right adnexa normal.   Musculoskeletal:      Cervical back: Neck supple.      Comments: I cannot palpate the hematoma the patient has felt . Laying down and standing. It has resolved    Lymphadenopathy:      Upper Body:      Right upper body: No supraclavicular or axillary adenopathy.      Left upper body: No supraclavicular or axillary adenopathy.   Skin:     General: Skin is warm and dry.   Neurological:      Mental Status: She is alert and oriented to person, place, and time.   Psychiatric:         Mood and Affect: Mood normal.         ASSESSMENT       Problems Addressed this Visit    None  Visit Diagnoses     Annual physical exam    -  Primary    Need for tetanus, diphtheria, and acellular pertussis (Tdap) vaccine in patient of adolescent age or older        Relevant Orders    Tdap Vaccine Greater Than or Equal To 8yo IM (Completed)    Screening mammogram for breast cancer        Relevant Orders    Mammo screening digital tomosynthesis bilateral w CAD      Diagnoses       Codes Comments    Annual physical exam    -  Primary ICD-10-CM: Z00.00  ICD-9-CM: V70.0     Need for tetanus, diphtheria, and acellular pertussis (Tdap) vaccine in patient of adolescent age or older     ICD-10-CM: Z23  ICD-9-CM: V06.1     Screening mammogram  for breast cancer     ICD-10-CM: Z12.31  ICD-9-CM: V76.12           PLAN    Her BP was slightly elevated today, recommend she monitor her BP at home 3 times a week and follow up if consistently >130/80  [facility]  Mammogram is due in June, order placed  TDAP today   Reassured patient the hematoma that she has had I could not palpate on exam  Recommend claritin and flonase otc for seasonal allergies   Age and sex appropriate physical exam performed and documented. Updated past medical, family, social and surgical histories as well as allergies and care team list. Addressed care gaps listed in the medical record.   -Encouraged seat belt use for every car ride for patient and all occupants. Encouraged sunscreen use to reduce risk of skin cancer for any days with sun exposure over 20 minutes. Recommended helmet if biking or riding motorcycle to prevent head trauma.   -Encouraged annual dental and vision exams as part of their overall health.   -Encouraged minimum of 30 minutes or more of exercise at a brisk walk or higher 5 days per week combined with a well-balanced diet.   -Immunizations reviewed and updated in EMR.     Patient was given instructions and counseling regarding her  condition for health maintenance advice.  Please see specific information pulled into the AVS if appropriate.    declines

## 2024-05-09 PROBLEM — Z80.0 FAMILY HISTORY OF COLON CANCER IN MOTHER: Status: ACTIVE | Noted: 2024-05-07

## 2024-05-09 PROBLEM — Z12.11 SCREENING FOR COLON CANCER: Status: ACTIVE | Noted: 2024-05-07

## 2024-05-09 PROBLEM — Z80.0 FAMILY HISTORY OF COLON CANCER IN FATHER: Status: ACTIVE | Noted: 2024-05-07

## 2024-07-15 ENCOUNTER — ANESTHESIA (OUTPATIENT)
Dept: GASTROENTEROLOGY | Facility: HOSPITAL | Age: 64
End: 2024-07-15
Payer: COMMERCIAL

## 2024-07-15 ENCOUNTER — HOSPITAL ENCOUNTER (OUTPATIENT)
Facility: HOSPITAL | Age: 64
Setting detail: HOSPITAL OUTPATIENT SURGERY
Discharge: HOME | End: 2024-07-15
Attending: SURGERY | Admitting: SURGERY
Payer: COMMERCIAL

## 2024-07-15 ENCOUNTER — ANESTHESIA EVENT (OUTPATIENT)
Dept: GASTROENTEROLOGY | Facility: HOSPITAL | Age: 64
End: 2024-07-15
Payer: COMMERCIAL

## 2024-07-15 VITALS
RESPIRATION RATE: 20 BRPM | HEIGHT: 66 IN | DIASTOLIC BLOOD PRESSURE: 83 MMHG | SYSTOLIC BLOOD PRESSURE: 147 MMHG | WEIGHT: 180 LBS | HEART RATE: 70 BPM | BODY MASS INDEX: 28.93 KG/M2 | OXYGEN SATURATION: 95 %

## 2024-07-15 DIAGNOSIS — Z80.0 FAMILY HISTORY OF COLON CANCER IN MOTHER: ICD-10-CM

## 2024-07-15 DIAGNOSIS — Z12.11 SCREENING FOR COLON CANCER: ICD-10-CM

## 2024-07-15 DIAGNOSIS — Z80.0 FAMILY HISTORY OF COLON CANCER IN FATHER: ICD-10-CM

## 2024-07-15 PROBLEM — K63.5 COLON POLYPS: Status: ACTIVE | Noted: 2024-07-15

## 2024-07-15 PROBLEM — K57.90 DIVERTICULOSIS: Status: ACTIVE | Noted: 2024-07-15

## 2024-07-15 PROCEDURE — 25810000003 LACTATED RINGERS PER 1000 ML: Performed by: ANESTHESIOLOGY

## 2024-07-15 PROCEDURE — S0260 H&P FOR SURGERY: HCPCS | Performed by: SURGERY

## 2024-07-15 PROCEDURE — 25010000002 PROPOFOL 1000 MG/100ML EMULSION: Performed by: ANESTHESIOLOGY

## 2024-07-15 PROCEDURE — 25010000002 PROPOFOL 200 MG/20ML EMULSION: Performed by: ANESTHESIOLOGY

## 2024-07-15 PROCEDURE — 45380 COLONOSCOPY AND BIOPSY: CPT | Performed by: SURGERY

## 2024-07-15 PROCEDURE — 88305 TISSUE EXAM BY PATHOLOGIST: CPT | Performed by: SURGERY

## 2024-07-15 RX ORDER — PROPOFOL 10 MG/ML
INJECTION, EMULSION INTRAVENOUS AS NEEDED
Status: DISCONTINUED | OUTPATIENT
Start: 2024-07-15 | End: 2024-07-15 | Stop reason: SURG

## 2024-07-15 RX ORDER — SODIUM CHLORIDE, SODIUM LACTATE, POTASSIUM CHLORIDE, CALCIUM CHLORIDE 600; 310; 30; 20 MG/100ML; MG/100ML; MG/100ML; MG/100ML
INJECTION, SOLUTION INTRAVENOUS CONTINUOUS PRN
Status: DISCONTINUED | OUTPATIENT
Start: 2024-07-15 | End: 2024-07-15 | Stop reason: SURG

## 2024-07-15 RX ORDER — PROPOFOL 10 MG/ML
INJECTION, EMULSION INTRAVENOUS CONTINUOUS PRN
Status: DISCONTINUED | OUTPATIENT
Start: 2024-07-15 | End: 2024-07-15 | Stop reason: SURG

## 2024-07-15 RX ORDER — LIDOCAINE HYDROCHLORIDE 20 MG/ML
INJECTION, SOLUTION INFILTRATION; PERINEURAL AS NEEDED
Status: DISCONTINUED | OUTPATIENT
Start: 2024-07-15 | End: 2024-07-15 | Stop reason: SURG

## 2024-07-15 RX ORDER — SODIUM CHLORIDE 0.9 % (FLUSH) 0.9 %
10 SYRINGE (ML) INJECTION AS NEEDED
Status: DISCONTINUED | OUTPATIENT
Start: 2024-07-15 | End: 2024-07-15 | Stop reason: HOSPADM

## 2024-07-15 RX ORDER — SODIUM CHLORIDE, SODIUM LACTATE, POTASSIUM CHLORIDE, CALCIUM CHLORIDE 600; 310; 30; 20 MG/100ML; MG/100ML; MG/100ML; MG/100ML
1000 INJECTION, SOLUTION INTRAVENOUS CONTINUOUS
Status: DISCONTINUED | OUTPATIENT
Start: 2024-07-15 | End: 2024-07-15 | Stop reason: HOSPADM

## 2024-07-15 RX ORDER — LIDOCAINE HYDROCHLORIDE 10 MG/ML
0.5 INJECTION, SOLUTION INFILTRATION; PERINEURAL ONCE AS NEEDED
Status: DISCONTINUED | OUTPATIENT
Start: 2024-07-15 | End: 2024-07-15 | Stop reason: HOSPADM

## 2024-07-15 RX ADMIN — LIDOCAINE HYDROCHLORIDE 60 MG: 20 INJECTION, SOLUTION INFILTRATION; PERINEURAL at 12:12

## 2024-07-15 RX ADMIN — PROPOFOL 200 MCG/KG/MIN: 10 INJECTION, EMULSION INTRAVENOUS at 12:12

## 2024-07-15 RX ADMIN — PROPOFOL INJECTABLE EMULSION 100 MG: 10 INJECTION, EMULSION INTRAVENOUS at 12:12

## 2024-07-15 RX ADMIN — SODIUM CHLORIDE, SODIUM LACTATE, POTASSIUM CHLORIDE, AND CALCIUM CHLORIDE: 600; 310; 30; 20 INJECTION, SOLUTION INTRAVENOUS at 12:08

## 2024-07-15 NOTE — ANESTHESIA PREPROCEDURE EVALUATION
Anesthesia Evaluation     Patient summary reviewed and Nursing notes reviewed                Airway   Mallampati: II  Dental      Pulmonary - negative pulmonary ROS   Cardiovascular - negative cardio ROS    ECG reviewed  Rhythm: regular  Rate: normal        Neuro/Psych- negative ROS  GI/Hepatic/Renal/Endo    (+) obesity    Musculoskeletal (-) negative ROS    Abdominal    Substance History - negative use     OB/GYN negative ob/gyn ROS         Other                    Anesthesia Plan    ASA 2     MAC     intravenous induction     Anesthetic plan, risks, benefits, and alternatives have been provided, discussed and informed consent has been obtained with: patient.    CODE STATUS:

## 2024-07-15 NOTE — H&P
General Surgery  History and Physical    CC: Screening for colon cancer    HPI: The patient is a pleasant 63 y.o. year-old lady who presents today for a routine screening colonoscopy.  Her last colonoscopy was done in 2014 in [place] and she does not recall there being any polyps identified.  She has a family history of colon cancer in both her mother and father who are in their late 80s when they were diagnosed.  She denies any melena or hematochezia.    Past Medical History:   None    Past Surgical History:   Colonoscopy (2014, [place])  Tonsillectomy  Lasik eye surgery    Medications:   Medications Prior to Admission   Medication Sig Dispense Refill Last Dose    COLLAGEN PO Take 1 dose by mouth Daily. PT HOLDING FOR SURGERY   7/12/2024       Allergies: Clindamycin, CT contrast, tetracycline, sulfa antibiotics, penicillin, erythromycin, bee venom    Family History: Mother with history of colon cancer, father with history of colon cancer and prostate cancer, sister with history of breast cancer    Social History: , non-smoker, no regular alcohol use    ROS: A comprehensive review of systems was conducted and negative for melena or hematochezia  All other systems reviewed and negative    Physical Exam:  Vitals:    07/15/24 1123   BP: 147/83   Pulse: 70   Resp: 20   SpO2: 95%     Height: 167 cm  Weight: 81 kg  BMI: 29  General: No acute distress, well-nourished & well-developed  HEAD: normocephalic, atraumatic  EYES: normal conjunctiva, sclera anicteric  EARS: grossly normal hearing  NECK: supple, no thyromegaly  CARDIOVASCULAR: regular rate and rhythm  RESPIRATORY: clear to auscultation bilaterally  GASTROINTESTINAL: soft, nontender, non-distended  PSYCHIATRIC: oriented x3, normal mood and affect    ASSESSMENT & PLAN  Mrs. Rseendez is a 63-year-old lady here for routine screening colonoscopy.  She has a strong family history of colon cancer in both her mother and father.  She has been counseled on the risks of  the procedure to include bleeding, colon perforation, and missed pathology.  Despite these risks, she has consented to proceed.    Kathleen Wolf MD  General, Robotic, and Endoscopic Surgery  [facility]    [address], [address]  [place] [address]  P: [phone]  F: [phone]

## 2024-07-15 NOTE — ANESTHESIA POSTPROCEDURE EVALUATION
"Patient: Trang Resendez    Procedure Summary       Date: 07/15/24 Room / Location: [facility] ENDOSCOPY 10 / [facility] ENDOSCOPY    Anesthesia Start: 1208 Anesthesia Stop: 1233    Procedure: COLONOSCOPY to cecum with cold biopsy polypectomy Diagnosis:       Screening for colon cancer      Family history of colon cancer in mother      Family history of colon cancer in father      (Screening for colon cancer [Z12.11])      (Family history of colon cancer in mother [Z80.0])      (Family history of colon cancer in father [Z80.0])    Surgeons: Kathleen Wolf MD Provider: Kate Padron MD    Anesthesia Type: MAC ASA Status: 2            Anesthesia Type: MAC    Vitals  Vitals Value Taken Time   /102 07/15/24 1257   Temp     Pulse 63 07/15/24 1258   Resp     SpO2 95 % 07/15/24 1258   Vitals shown include unfiled device data.        Post Anesthesia Care and Evaluation    Patient location during evaluation: bedside  Patient participation: complete - patient participated  Level of consciousness: awake  Pain management: adequate    Airway patency: patent  Anesthetic complications: No anesthetic complications    Cardiovascular status: acceptable  Respiratory status: acceptable  Hydration status: acceptable    Comments: /83 (BP Location: Left arm, Patient Position: Lying)   Pulse 70   Resp 20   Ht 167 cm (65.75\")   Wt 81.6 kg (180 lb)   SpO2 95%   BMI 29.27 kg/m²     "

## 2024-07-15 NOTE — OP NOTE
Operative Note :  Kathleen Wolf MD      Trang Resendez  1960    Procedure Date: 07/15/24    Pre-op Diagnosis:  Screening for colon cancer [Z12.11]  Family history of colon cancer in mother [Z80.0]  Family history of colon cancer in father [Z80.0]    Post-Operative Diagnosis:  Colon polyp  Diverticulosis  Internal hemorrhoids    Procedure:   Flexible colonoscopy to the cecum with cold forcep polypectomy    Surgeon: Kathleen Wolf MD    Assistant: None    Anesthesia:  MAC (monitored anesthetic care)    Estimated Blood Loss: Minimal    Specimens: Distal transverse colon polyp    Complications: None    Indications:  Mrs. Resendez is a 63-year-old lady here for routine screening colonoscopy.  She has a strong family history of colon cancer in both her mother and father.  She has been counseled on the risks of the procedure to include bleeding, colon perforation, and missed pathology.  Despite these risks, she has consented to proceed.     Findings: Minimal sigmoid diverticulosis (only 1 diverticular patch identified), nonbleeding grade 1 internal hemorrhoids, tiny possible polyp of transverse colon removed    Description of procedure:  The patient was brought to the endoscopy suite and gsu in the left lateral decubitus position.  Continuous propofol anesthesia was administered.  A surgical timeout was completed.  A digital rectal exam was performed, revealing no abnormalities.  An adult colonoscope was then inserted through the anus and passed under direct visualization to the level of the cecum.  The cecum was identified via the ileocecal valve as well as the appendiceal orifice.  The scope was then slowly withdrawn, examining all circumferential walls of the ascending, transverse, descending, and sigmoid colon.  There was a tiny potential polyp of the distal transverse colon removed using the biopsy forceps.  It measured no more than 2 mm in diameter.  Within the sigmoid colon I saw the opening to 1  diverticular pouch consistent with diverticulosis, but there is no evidence for diverticulitis.  There was no evidence of bleeding from the diverticulum.  Within the rectum the scope was retroflexed and showed nonbleeding grade 1 internal hemorrhoids.  The scope was then withdrawn and the colon desufflated.  The patient had a very good bowel prep and was transferred to the recovery area in stable condition.     Recommendations:  I will call the patient in 1 week or less with the pathology results of the one polyp removed as this will determine when the next screening colonoscopy will be due.    Kathleen Wolf MD  General, Robotic, and Endoscopic Surgery  [facility]    [address], [address]  [place] [address]  P: [phone]  F: [phone]

## 2024-07-15 NOTE — DISCHARGE INSTRUCTIONS
For the next 24 hours patient needs to be with a responsible adult.    For 24 hours DO NOT drive, operate machinery, appliances, drink alcohol, make important decisions or sign legal documents.    Start with a light or bland diet if you are feeling sick to your stomach otherwise advance to regular diet as tolerated.    Follow recommendations on procedure report if provided by your doctor.    Call Dr Wolf for problems [phone].  Biopsy results within 2 weeks    Problems may include but not limited to: large amounts of bleeding, trouble breathing, repeated vomiting, severe unrelieved pain, fever or chills.

## 2024-07-16 LAB
LAB AP CASE REPORT: NORMAL
PATH REPORT.FINAL DX SPEC: NORMAL
PATH REPORT.GROSS SPEC: NORMAL

## 2024-07-22 ENCOUNTER — TELEPHONE (OUTPATIENT)
Dept: SURGERY | Facility: CLINIC | Age: 64
End: 2024-07-22
Payer: COMMERCIAL

## 2024-07-22 NOTE — TELEPHONE ENCOUNTER
I called Trang and discussed the benign pathology findings from colonoscopy last week.  Her single polyp returned as a hyperplastic polyp with no evidence of adenomatous growth.  Given her family history of colon cancer in both her mother and father, I would recommend she needs to have another screening colonoscopy in 5 years.  She expressed understanding.    Jenniffer, please update this patient's health maintenance tab and recall pool to reflect a 5-year interval.    Thanks,  DELBERT

## 2025-04-18 DIAGNOSIS — Z00.00 ROUTINE HEALTH MAINTENANCE: Primary | ICD-10-CM

## 2025-04-28 LAB
ALBUMIN SERPL-MCNC: 4.3 G/DL (ref 3.5–5.2)
ALBUMIN/GLOB SERPL: 1.8 G/DL
ALP SERPL-CCNC: 102 U/L (ref 39–117)
ALT SERPL-CCNC: 13 U/L (ref 1–33)
APPEARANCE UR: ABNORMAL
AST SERPL-CCNC: 25 U/L (ref 1–32)
BACTERIA #/AREA URNS HPF: ABNORMAL /HPF
BASOPHILS # BLD AUTO: 0.04 10*3/MM3 (ref 0–0.2)
BASOPHILS NFR BLD AUTO: 0.7 % (ref 0–1.5)
BILIRUB SERPL-MCNC: 0.5 MG/DL (ref 0–1.2)
BILIRUB UR QL STRIP: NEGATIVE
BUN SERPL-MCNC: 14 MG/DL (ref 8–23)
BUN/CREAT SERPL: 14.4 (ref 7–25)
CALCIUM SERPL-MCNC: 9.3 MG/DL (ref 8.6–10.5)
CASTS URNS MICRO: ABNORMAL
CHLORIDE SERPL-SCNC: 105 MMOL/L (ref 98–107)
CHOLEST SERPL-MCNC: 163 MG/DL (ref 0–200)
CO2 SERPL-SCNC: 25.1 MMOL/L (ref 22–29)
COLOR UR: YELLOW
CREAT SERPL-MCNC: 0.97 MG/DL (ref 0.57–1)
EGFRCR SERPLBLD CKD-EPI 2021: 65 ML/MIN/1.73
EOSINOPHIL # BLD AUTO: 0.22 10*3/MM3 (ref 0–0.4)
EOSINOPHIL NFR BLD AUTO: 3.6 % (ref 0.3–6.2)
EPI CELLS #/AREA URNS HPF: ABNORMAL /HPF
ERYTHROCYTE [DISTWIDTH] IN BLOOD BY AUTOMATED COUNT: 13.4 % (ref 12.3–15.4)
GLOBULIN SER CALC-MCNC: 2.4 GM/DL
GLUCOSE SERPL-MCNC: 84 MG/DL (ref 65–99)
GLUCOSE UR QL STRIP: NEGATIVE
HCT VFR BLD AUTO: 43.5 % (ref 34–46.6)
HDLC SERPL-MCNC: 59 MG/DL (ref 40–60)
HGB BLD-MCNC: 14.6 G/DL (ref 12–15.9)
HGB UR QL STRIP: NEGATIVE
IMM GRANULOCYTES # BLD AUTO: 0.01 10*3/MM3 (ref 0–0.05)
IMM GRANULOCYTES NFR BLD AUTO: 0.2 % (ref 0–0.5)
KETONES UR QL STRIP: ABNORMAL
LDLC SERPL CALC-MCNC: 87 MG/DL (ref 0–100)
LDLC/HDLC SERPL: 1.46 {RATIO}
LEUKOCYTE ESTERASE UR QL STRIP: NEGATIVE
LYMPHOCYTES # BLD AUTO: 1.66 10*3/MM3 (ref 0.7–3.1)
LYMPHOCYTES NFR BLD AUTO: 27.5 % (ref 19.6–45.3)
MCH RBC QN AUTO: 30.2 PG (ref 26.6–33)
MCHC RBC AUTO-ENTMCNC: 33.6 G/DL (ref 31.5–35.7)
MCV RBC AUTO: 90.1 FL (ref 79–97)
MONOCYTES # BLD AUTO: 0.59 10*3/MM3 (ref 0.1–0.9)
MONOCYTES NFR BLD AUTO: 9.8 % (ref 5–12)
NEUTROPHILS # BLD AUTO: 3.51 10*3/MM3 (ref 1.7–7)
NEUTROPHILS NFR BLD AUTO: 58.2 % (ref 42.7–76)
NITRITE UR QL STRIP: NEGATIVE
NRBC BLD AUTO-RTO: 0 /100 WBC (ref 0–0.2)
PH UR STRIP: 5.5 [PH] (ref 5–8)
PLATELET # BLD AUTO: 248 10*3/MM3 (ref 140–450)
POTASSIUM SERPL-SCNC: 4.4 MMOL/L (ref 3.5–5.2)
PROT SERPL-MCNC: 6.7 G/DL (ref 6–8.5)
PROT UR QL STRIP: NEGATIVE
RBC # BLD AUTO: 4.83 10*6/MM3 (ref 3.77–5.28)
RBC #/AREA URNS HPF: ABNORMAL /HPF
SODIUM SERPL-SCNC: 142 MMOL/L (ref 136–145)
SP GR UR STRIP: 1.02 (ref 1–1.03)
TRIGL SERPL-MCNC: 90 MG/DL (ref 0–150)
TSH SERPL DL<=0.005 MIU/L-ACNC: 2.74 UIU/ML (ref 0.27–4.2)
UROBILINOGEN UR STRIP-MCNC: ABNORMAL MG/DL
VLDLC SERPL CALC-MCNC: 17 MG/DL (ref 5–40)
WBC # BLD AUTO: 6.03 10*3/MM3 (ref 3.4–10.8)
WBC #/AREA URNS HPF: ABNORMAL /HPF

## 2025-05-01 ENCOUNTER — OFFICE VISIT (OUTPATIENT)
Dept: INTERNAL MEDICINE | Facility: CLINIC | Age: 65
End: 2025-05-01
Payer: COMMERCIAL

## 2025-05-01 VITALS
OXYGEN SATURATION: 96 % | SYSTOLIC BLOOD PRESSURE: 158 MMHG | WEIGHT: 181 LBS | HEIGHT: 66 IN | BODY MASS INDEX: 29.09 KG/M2 | HEART RATE: 99 BPM | DIASTOLIC BLOOD PRESSURE: 82 MMHG

## 2025-05-01 DIAGNOSIS — I10 PRIMARY HYPERTENSION: ICD-10-CM

## 2025-05-01 DIAGNOSIS — Z00.00 ANNUAL PHYSICAL EXAM: Primary | ICD-10-CM

## 2025-05-01 RX ORDER — LOSARTAN POTASSIUM 25 MG/1
25 TABLET ORAL DAILY
Qty: 30 TABLET | Refills: 5 | Status: SHIPPED | OUTPATIENT
Start: 2025-05-01

## 2025-05-01 NOTE — PROGRESS NOTES
Subjective   Trang Resendez is a 64 y.o. female. Patient is here today for   Chief Complaint   Patient presents with    Annual Exam          Vitals:    05/01/25 0830   BP: 158/82   Pulse:    SpO2:      Body mass index is 29.44 kg/m².    The following portions of the patient's history were reviewed and updated as appropriate: allergies, current medications, past family history, past medical history, past social history, past surgical history and problem list.    Past Medical History:   Diagnosis Date    Allergic     penicillin, sulfa drugs, cyclines, amoxicillin, clindamycin, erythromycin, bee venom    History of 2019 novel coronavirus disease (COVID-19)     History of medical problems February 2022    Ringing in ears      Allergies   Allergen Reactions    Bee Venom Swelling    Clindamycin Rash    Ct Contrast Rash    Erythromycin Rash    Penicillins Rash    Sulfa Antibiotics Rash    Tetracycline Rash      Social History     Socioeconomic History    Marital status:    Tobacco Use    Smoking status: Never    Smokeless tobacco: Never   Vaping Use    Vaping status: Never Used   Substance and Sexual Activity    Alcohol use: Never    Drug use: Never    Sexual activity: Yes     Partners: Male     Birth control/protection: Post-menopausal, None        Current Outpatient Medications:     COLLAGEN PO, Take 1 dose by mouth Daily. PT HOLDING FOR SURGERY, Disp: , Rfl:     losartan (Cozaar) 25 MG tablet, Take 1 tablet by mouth Daily., Disp: 30 tablet, Rfl: 5          History of Present Illness   Trang Resendez 64 y.o. female who presents for an Annual physical exam.   Lab results discussed in detail with the patient.  Plan to update vaccines if needed today. She has been monitoring her blood pressure at home regularly     Health Habits:  Dental Exam. up to date  Eye Exam. up to date  Exercise: 5 times/week.  Current exercise activities include: running/ jogging, stationary bicycling/spin class, swimming, tapes/CDs/TV  fitness programs at home, and weightlifting      Preventative counseling  - seat belt use for every car ride for patient and all occupants.   Encouraged sunscreen use to reduce risk of skin cancer for any days with sun exposure over 20 minutes.   -Encouraged annual dental and vision exams as part of their overall health.   -Encouraged  exercise  combined with a well-balanced diet.   -Immunizations reviewed and updated in EMR.     The 10-year ASCVD risk score (Ilya ALFORD, et al., 2019) is: 9.8%    Values used to calculate the score:      Age: 64 years      Sex: Female      Is Non- : No      Diabetic: No      Tobacco smoker: No      Systolic Blood Pressure: 158 mmHg      Is BP treated: Yes      HDL Cholesterol: 59 mg/dL      Total Cholesterol: 163 mg/dL    Lab Results (most recent)       Orders Only on 04/18/2025   Component Date Value Ref Range Status    WBC 04/28/2025 6.03  3.40 - 10.80 10*3/mm3 Final    RBC 04/28/2025 4.83  3.77 - 5.28 10*6/mm3 Final    Hemoglobin 04/28/2025 14.6  12.0 - 15.9 g/dL Final    Hematocrit 04/28/2025 43.5  34.0 - 46.6 % Final    MCV 04/28/2025 90.1  79.0 - 97.0 fL Final    MCH 04/28/2025 30.2  26.6 - 33.0 pg Final    MCHC 04/28/2025 33.6  31.5 - 35.7 g/dL Final    RDW 04/28/2025 13.4  12.3 - 15.4 % Final    Platelets 04/28/2025 248  140 - 450 10*3/mm3 Final    Neutrophil Rel % 04/28/2025 58.2  42.7 - 76.0 % Final    Lymphocyte Rel % 04/28/2025 27.5  19.6 - 45.3 % Final    Monocyte Rel % 04/28/2025 9.8  5.0 - 12.0 % Final    Eosinophil Rel % 04/28/2025 3.6  0.3 - 6.2 % Final    Basophil Rel % 04/28/2025 0.7  0.0 - 1.5 % Final    Neutrophils Absolute 04/28/2025 3.51  1.70 - 7.00 10*3/mm3 Final    Lymphocytes Absolute 04/28/2025 1.66  0.70 - 3.10 10*3/mm3 Final    Monocytes Absolute 04/28/2025 0.59  0.10 - 0.90 10*3/mm3 Final    Eosinophils Absolute 04/28/2025 0.22  0.00 - 0.40 10*3/mm3 Final    Basophils Absolute 04/28/2025 0.04  0.00 - 0.20 10*3/mm3 Final     Immature Granulocyte Rel % 04/28/2025 0.2  0.0 - 0.5 % Final    Immature Grans Absolute 04/28/2025 0.01  0.00 - 0.05 10*3/mm3 Final    nRBC 04/28/2025 0.0  0.0 - 0.2 /100 WBC Final    Glucose 04/28/2025 84  65 - 99 mg/dL Final    BUN 04/28/2025 14  8 - 23 mg/dL Final    Creatinine 04/28/2025 0.97  0.57 - 1.00 mg/dL Final    EGFR Result 04/28/2025 65.0  >60.0 mL/min/1.73 Final    Comment: GFR Categories in Chronic Kidney Disease (CKD)/X09/  /X09/  GFR Category          GFR (mL/min/1.73)    Interpretation  G1/X09/                    90 or greater/X09/        Normal or high  (1)  G2//                    60-89                Mild decrease  (1)  G3a                   45-59                Mild to moderate  decrease  G3b                   30-44                Moderate to  severe decrease  G4                    15-29                Severe decrease  G5                    14 or less           Kidney failure//  /[id number]/  (1)In the absence of evidence of kidney disease, neither  GFR category G1 or G2 fulfill the criteria for CKD.  eGFR calculation 2021 CKD-EPI creatinine equation, which  does not include race as a factor      BUN/Creatinine Ratio 04/28/2025 14.4  7.0 - 25.0 Final    Sodium 04/28/2025 142  136 - 145 mmol/L Final    Potassium 04/28/2025 4.4  3.5 - 5.2 mmol/L Final    Chloride 04/28/2025 105  98 - 107 mmol/L Final    Total CO2 04/28/2025 25.1  22.0 - 29.0 mmol/L Final    Calcium 04/28/2025 9.3  8.6 - 10.5 mg/dL Final    Total Protein 04/28/2025 6.7  6.0 - 8.5 g/dL Final    Albumin 04/28/2025 4.3  3.5 - 5.2 g/dL Final    Globulin 04/28/2025 2.4  gm/dL Final    A/G Ratio 04/28/2025 1.8  g/dL Final    Total Bilirubin 04/28/2025 0.5  0.0 - 1.2 mg/dL Final    Alkaline Phosphatase 04/28/2025 102  39 - 117 U/L Final    AST (SGOT) 04/28/2025 25  1 - 32 U/L Final    ALT (SGPT) 04/28/2025 13  1 - 33 U/L Final    Total Cholesterol 04/28/2025 163  0 - 200 mg/dL Final    Comment: Cholesterol Reference  Ranges  (U.S. Department of Health and Human Services ATP III  Classifications)  Desirable          <200 mg/dL  Borderline High    200-239 mg/dL  High Risk          >240 mg/dL  Triglyceride Reference Ranges  (U.S. Department of Health and Human Services ATP III  Classifications)  Normal           <150 mg/dL  Borderline High  150-199 mg/dL  High             200-499 mg/dL  Very High        >500 mg/dL  HDL Reference Ranges  (U.S. Department of Health and Human Services ATP III  Classifications)  Low     <40 mg/dl (major risk factor for CHD)  High    >60 mg/dl ('negative' risk factor for CHD)  LDL Reference Ranges  (U.S. Department of Health and Human Services ATP III  Classifications)  Optimal          <100 mg/dL  Near Optimal     100-129 mg/dL  Borderline High  130-159 mg/dL  High             160-189 mg/dL  Very High        >189 mg/dL  LDL is calculated using the [facility] LDL-C calculation.      Triglycerides 04/28/2025 90  0 - 150 mg/dL Final    HDL Cholesterol 04/28/2025 59  40 - 60 mg/dL Final    VLDL Cholesterol [place] 04/28/2025 17  5 - 40 mg/dL Final    LDL Chol Calc ([facility]) 04/28/2025 87  0 - 100 mg/dL Final    LDL/HDL RATIO 04/28/2025 1.46   Final    TSH 04/28/2025 2.740  0.270 - 4.200 uIU/mL Final    Specific [place], UA 04/28/2025 1.021  1.005 - 1.030 Final    pH, UA 04/28/2025 5.5  5.0 - 8.0 Final    Color, UA 04/28/2025 Yellow   Final    REFERENCE RANGE: Yellow, Straw    Appearance, UA 04/28/2025 Cloudy (A)  Clear Final    Leukocytes, UA 04/28/2025 Negative  Negative Final    Protein 04/28/2025 Negative  Negative Final    Glucose, UA 04/28/2025 Negative  Negative Final    Ketones 04/28/2025 Trace (A)  Negative Final    Blood, UA 04/28/2025 Negative  Negative Final    Bilirubin, UA 04/28/2025 Negative  Negative Final    Urobilinogen, UA 04/28/2025 Comment   Final    Comment: 0.2 E.U./dL  REFERENCE RANGE: 0.2 - 1.0 E.U./dL      Nitrite, UA 04/28/2025 Negative  Negative Final    WBC, UA 04/28/2025 0-2  /HPF Final     REFERENCE RANGE: None Seen, 0-2    RBC, UA 04/28/2025 0-2  /HPF Final    REFERENCE RANGE: None Seen, 0-2    Epithelial Cells (non renal) 04/28/2025 3-6 (A)  /HPF Final    REFERENCE RANGE: None Seen, 0-2    Cast Type 04/28/2025 Comment   Final    HYALINE CASTS, UA            0-2              /LPF       None Seen  N    Bacteria, UA 04/28/2025 Comment  None Seen /HPF Final    None Seen                 Health Maintenance   Topic Date Due    DXA SCAN  Never done    COVID-19 Vaccine (4 - 2024-25 season) 05/15/2025 (Originally 9/1/2024)    Pneumococcal Vaccine 50+ (1 of 1 - PCV) 10/28/2025 (Originally 9/20/2009)    ZOSTER VACCINE (1 of 2) 10/28/2025 (Originally 9/20/2009)    INFLUENZA VACCINE  07/01/2025    MAMMOGRAM  01/01/2026    ANNUAL PHYSICAL  05/01/2026    COLORECTAL CANCER SCREENING  07/15/2029    TDAP/TD VACCINES (2 - Td or Tdap) 03/22/2033    HEPATITIS C SCREENING  Completed           Review of Systems   Constitutional:  Negative for fatigue.   HENT: Negative.     Eyes:  Negative for visual disturbance.   Respiratory:  Negative for chest tightness, shortness of breath and wheezing.    Cardiovascular:  Negative for chest pain, palpitations and leg swelling.   Gastrointestinal: Negative.    Genitourinary: Negative.    Musculoskeletal: Negative.    Skin:  Positive for rash.   Neurological:  Negative for dizziness and headaches.   Hematological: Negative.    Psychiatric/Behavioral: Negative.         Objective   Physical Exam  Vitals and nursing note reviewed.   Constitutional:       General: She is not in acute distress.     Appearance: Normal appearance. She is well-developed and well-groomed.   HENT:      Head: Normocephalic.      Right Ear: Tympanic membrane and ear canal normal.      Left Ear: Tympanic membrane and ear canal normal.      Nose: Nose normal.      Mouth/Throat:      Pharynx: Oropharynx is clear.   Eyes:      General: Lids are normal.      Conjunctiva/sclera: Conjunctivae normal.   Neck:      Thyroid:  No thyromegaly.   Cardiovascular:      Rate and Rhythm: Normal rate and regular rhythm.      Heart sounds: Normal heart sounds.      Comments: Varicose veins noted bilaterally   Pulmonary:      Effort: Pulmonary effort is normal.      Breath sounds: Normal breath sounds.   Musculoskeletal:      Cervical back: Neck supple.      Right lower leg: No edema.      Left lower leg: No edema.   Skin:     General: Skin is warm and dry.      Findings: Rash (on face, mostly on right side, scattered erythematous macules) present. Rash is not pustular.   Neurological:      Mental Status: She is alert and oriented to person, place, and time.   Psychiatric:         Attention and Perception: Attention normal.         Mood and Affect: Mood normal.         ASSESSMENT       Problems Addressed this Visit       Primary hypertension    Relevant Medications    losartan (Cozaar) 25 MG tablet     Other Visit Diagnoses         Annual physical exam    -  Primary          Diagnoses         Codes Comments      Annual physical exam    -  Primary ICD-10-CM: Z00.00  ICD-9-CM: V70.0       Primary hypertension     ICD-10-CM: I10  ICD-9-CM: 401.9             PLAN    Age and sex appropriate physical exam performed and documented. Updated past medical, family, social and surgical histories as well as allergies and care team list.   Labs are normal, kidney function is stable,   [facility]  Declines shingles , prevnar 20 and covid booster  BP is high today and she has had elevated readings at home. She has had elevated readings in office her last 3 physicals. She is asymptomatic. She has been hesitant to start medication . At this time recommend starting medication and continuing lifestyle modifications. She is exercising 6 days a week and limits caffeine. She eats a healthy diet, discussed low sodium diet. She denies any s/s of SEAN . Will start losartan 25mg daily and she will continue to monitor BP at home    Recommend she see her dermatologist for the rash on her face. It has improved . Discussed differentials Dermatitis , herpes zoster, rosacea       Return in about 1 month (around 6/1/2025). For blood pressure check or sooner if needed   Patient was given instructions and counseling regarding her  condition for health maintenance advice.  Please see specific information pulled into the AVS if appropriate.

## 2025-05-30 ENCOUNTER — OFFICE VISIT (OUTPATIENT)
Dept: INTERNAL MEDICINE | Facility: CLINIC | Age: 65
End: 2025-05-30
Payer: COMMERCIAL

## 2025-05-30 VITALS
OXYGEN SATURATION: 98 % | HEIGHT: 66 IN | BODY MASS INDEX: 30.05 KG/M2 | HEART RATE: 85 BPM | SYSTOLIC BLOOD PRESSURE: 128 MMHG | RESPIRATION RATE: 16 BRPM | WEIGHT: 187 LBS | DIASTOLIC BLOOD PRESSURE: 70 MMHG

## 2025-05-30 DIAGNOSIS — I10 PRIMARY HYPERTENSION: Primary | ICD-10-CM

## 2025-05-30 PROCEDURE — 99213 OFFICE O/P EST LOW 20 MIN: CPT | Performed by: NURSE PRACTITIONER

## 2025-05-30 RX ORDER — LOSARTAN POTASSIUM 25 MG/1
25 TABLET ORAL DAILY
Qty: 90 TABLET | Refills: 3 | Status: SHIPPED | OUTPATIENT
Start: 2025-05-30

## 2025-05-30 NOTE — PROGRESS NOTES
Subjective   Trang Resendez is a 64 y.o. female. Patient is here today for   Chief Complaint   Patient presents with    Hypertension     Answers submitted by the patient for this visit:  High Blood Pressure Questionnaire (Submitted on 5/29/2025)  Chief Complaint: Hypertension  Chronicity: recurrent  Onset: more than 1 month ago  Condition status: controlled  anxiety: No  blurred vision: No  malaise/fatigue: No  orthopnea: No  peripheral edema: No  Agents associated with hypertension: no associated agents  Compliance problems: diet           Vitals:    05/30/25 1403   BP: 128/70   Pulse: 85   Resp: 16   SpO2: 98%     Body mass index is 30.41 kg/m².  The following portions of the patient's history were reviewed and updated as appropriate: allergies, current medications, past family history, past medical history, past social history, past surgical history and problem list.    History of Present Illness  The patient presents for a follow-up visit for HTN. She was started on losartan a month ago.   She is not experiencing any headaches. Her sleep pattern remains undisturbed, and she maintains a regular exercise regimen. I reviewed blood pressure log in [employer] message sent   She also mentions that she is currently under stress due to her father's health condition.   05/027:00 /83             7:15 PM         139/78  05/035:30 /81             7:30 PM         160/83  05/047:40 /86             7:08 PM         155/83  05/057:40 /86             7:45 PM         140/80  05/067:15 /88             7:30 PM         160/83  05/077:00 /87             8:00 PM         150/79  05/086:00 /86             8:30 PM         141/79  05/097:00 /88             8:00 PM         151/83  05/105:30 /91             7:45 PM         150/83  05/116:45 /74             8:00 PM         150/84  05/128:55 /85             7:00 PM         129/74  05/136:00 /74             8:20 PM          134/75  05/146:15 /74             8:10 PM         135/80  05/156:00 /86             7:30 PM         138/80  05/167:15 /79             7:30 PM         131/83  05/176:45 /89             7:30 PM         124/77  05/188:00 /83             10:00 AM       127/73             8:14 PM         137/79  05/197:30 /80             7:30 PM         136/76  05/206:30 /74             7:30 PM         116/86  05/217:00 /76             7:15 PM         139/82  05/225:45 /86             7:31 PM         142/81  05/236:45 /84             7:30 PM         145/79  05/247:15 /75             8:30 PM         147/78  05/258:30 /83      Past Medical History:   Diagnosis Date    Allergic     penicillin, sulfa drugs, cyclines, amoxicillin, clindamycin, erythromycin, bee venom    Cataract November 2023    History of 2019 novel coronavirus disease (COVID-19)     History of medical problems February 2022    Ringing in ears    Hypertension 2023      Allergies   Allergen Reactions    Bee Venom Swelling    Clindamycin Rash    Ct Contrast Rash    Erythromycin Rash    Penicillins Rash    Sulfa Antibiotics Rash    Tetracycline Rash      Social History     Socioeconomic History    Marital status:    Tobacco Use    Smoking status: Never    Smokeless tobacco: Never   Vaping Use    Vaping status: Never Used   Substance and Sexual Activity    Alcohol use: Never    Drug use: Never    Sexual activity: Yes     Partners: Male     Birth control/protection: Post-menopausal, None        Current Outpatient Medications:     COLLAGEN PO, Take 1 dose by mouth Daily. PT HOLDING FOR SURGERY, Disp: , Rfl:     losartan (Cozaar) 25 MG tablet, Take 1 tablet by mouth Daily., Disp: 90 tablet, Rfl: 3   Review of Systems   Constitutional:  Negative for fatigue.   Eyes:  Positive for visual disturbance (due to cataracts).   Respiratory:  Negative for shortness of breath.    Cardiovascular:  Negative for chest pain and  palpitations.   Neurological:  Negative for dizziness and headaches.       Objective   Physical Exam  Vitals and nursing note reviewed.   Constitutional:       General: She is not in acute distress.  Cardiovascular:      Rate and Rhythm: Normal rate and regular rhythm.      Heart sounds: Normal heart sounds.      Comments: Bp recheck 122/78  Pulmonary:      Effort: Pulmonary effort is normal.      Breath sounds: Normal breath sounds.   Neurological:      Mental Status: She is alert.         Assessment    Diagnoses and all orders for this visit:    1. Primary hypertension (Primary)    Other orders  -     losartan (Cozaar) 25 MG tablet; Take 1 tablet by mouth Daily.  Dispense: 90 tablet; Refill: 3        Assessment & Plan  1. Hypertension.  - Blood pressure readings have shown improvement, with a recent reading of 128/70.  - Physical exam today shows a blood pressure reading of 122/78.  - Discussed symptoms of high blood pressure, including dizziness, lightheadedness, headaches, fatigue,   -  continue current dose of losartan Prescription for a 90-day supply of antihypertensive medication will be sent to the pharmacy.     Follow-up  - Follow-up appointment scheduled in 1 year for CPE with labs         Return in about 1 year (around 5/30/2026) for Annual physical.    Patient or patient representative verbalized consent for the use of Ambient Listening during the visit with  DWIGHT Ozuna for chart documentation. 5/30/2025  16:23 EDT

## 2025-08-20 ENCOUNTER — PATIENT MESSAGE (OUTPATIENT)
Dept: INTERNAL MEDICINE | Facility: CLINIC | Age: 65
End: 2025-08-20
Payer: COMMERCIAL

## 2025-08-21 RX ORDER — LOSARTAN POTASSIUM 25 MG/1
25 TABLET ORAL DAILY
Qty: 90 TABLET | Refills: 3 | Status: SHIPPED | OUTPATIENT
Start: 2025-08-21

## (undated) DEVICE — SINGLE-USE BIOPSY FORCEPS: Brand: RADIAL JAW 4

## (undated) DEVICE — SENSR O2 OXIMAX FNGR A/ 18IN NONSTR

## (undated) DEVICE — KT ORCA ORCAPOD DISP STRL

## (undated) DEVICE — TUBING, SUCTION, 1/4" X 10', STRAIGHT: Brand: MEDLINE

## (undated) DEVICE — ADAPT CLN BIOGUARD AIR/H2O DISP

## (undated) DEVICE — GOWN,SIRUS,NON REINFRCD,LARGE,SET IN SL: Brand: MEDLINE

## (undated) DEVICE — CANN O2 ETCO2 FITS ALL CONN CO2 SMPL A/ 7IN DISP LF